# Patient Record
Sex: MALE | Race: WHITE
[De-identification: names, ages, dates, MRNs, and addresses within clinical notes are randomized per-mention and may not be internally consistent; named-entity substitution may affect disease eponyms.]

---

## 2021-01-27 ENCOUNTER — HOSPITAL ENCOUNTER (OUTPATIENT)
Dept: HOSPITAL 76 - DI.N | Age: 41
Discharge: HOME | End: 2021-01-27
Attending: FAMILY MEDICINE
Payer: COMMERCIAL

## 2021-01-27 DIAGNOSIS — S60.221A: ICD-10-CM

## 2021-01-27 DIAGNOSIS — M51.36: ICD-10-CM

## 2021-01-27 DIAGNOSIS — M47.816: Primary | ICD-10-CM

## 2021-01-27 NOTE — XRAY REPORT
PROCEDURE:  Lumbar Spine 2 View

 

INDICATIONS:  STRAIN OF MUSCLE, FASCIA AND TENDON OF LOWER BACK

 

TECHNIQUE:  3 views of the lumbar spine were acquired.  

 

COMPARISON:  None.

 

FINDINGS:  

 

Bones:  5 non-rib-bearing vertebrae are present. Minimal levocurvature, possibly positional. Mild low
er lumbar degenerative disc disease. Lower lumbar facet arthropathy. No vertebral body compression fr
actures.  No suspicious bony lesions.  

 

Soft tissues:  Overlying bowel gas pattern is normal.  No suspicious soft tissue calcifications.  

 

IMPRESSION:  Degenerative change. No acute bony abnormality of the lumbar spine.

 

Reviewed by: Charlie Truong MD on 1/27/2021 4:35 PM PST

Approved by: Charlie Truong MD on 1/27/2021 4:35 PM PST

 

 

Station ID:  [id number]

## 2021-01-27 NOTE — XRAY REPORT
PROCEDURE:  Hand 3 View RT

 

INDICATIONS:  CONTUSION OF R HAND

 

TECHNIQUE:  3 views of the hand(s) acquired.  

 

COMPARISON:  None

 

FINDINGS:  

 

Bones:  No fractures or dislocations.  No suspicious bony lesions.  

 

Soft tissues:  No suspicious soft tissue calcifications.  

 

IMPRESSION:  

No evidence acute bony abnormality of the right hand.

 

Reviewed by: Charlie Truong MD on 1/27/2021 4:35 PM PST

Approved by: Charlie Truong MD on 1/27/2021 4:35 PM [facility]

 

 

Station ID:  [id number]

## 2022-07-03 ENCOUNTER — HOSPITAL ENCOUNTER (EMERGENCY)
Dept: HOSPITAL 76 - ED | Age: 42
Discharge: HOME | End: 2022-07-03
Payer: COMMERCIAL

## 2022-07-03 VITALS — DIASTOLIC BLOOD PRESSURE: 79 MMHG | SYSTOLIC BLOOD PRESSURE: 133 MMHG

## 2022-07-03 DIAGNOSIS — M62.830: Primary | ICD-10-CM

## 2022-07-03 PROCEDURE — 72100 X-RAY EXAM L-S SPINE 2/3 VWS: CPT

## 2022-07-03 PROCEDURE — 99285 EMERGENCY DEPT VISIT HI MDM: CPT

## 2022-07-03 PROCEDURE — 99284 EMERGENCY DEPT VISIT MOD MDM: CPT

## 2022-07-03 PROCEDURE — 96374 THER/PROPH/DIAG INJ IV PUSH: CPT

## 2022-07-03 RX ADMIN — HYDROMORPHONE HYDROCHLORIDE STA: 1 INJECTION, SOLUTION INTRAMUSCULAR; INTRAVENOUS; SUBCUTANEOUS at 14:55

## 2022-07-03 RX ADMIN — KETOROLAC TROMETHAMINE STA: 30 INJECTION, SOLUTION INTRAMUSCULAR at 14:56

## 2022-07-03 RX ADMIN — DEXAMETHASONE SODIUM PHOSPHATE STA MG: 10 INJECTION, SOLUTION INTRAMUSCULAR; INTRAVENOUS at 14:54

## 2022-07-03 RX ADMIN — DEXAMETHASONE SODIUM PHOSPHATE STA: 10 INJECTION, SOLUTION INTRAMUSCULAR; INTRAVENOUS at 14:56

## 2022-07-03 RX ADMIN — DIAZEPAM STA MG: 5 INJECTION, SOLUTION INTRAMUSCULAR; INTRAVENOUS at 14:54

## 2022-07-03 RX ADMIN — HYDROMORPHONE HYDROCHLORIDE STA MG: 1 INJECTION, SOLUTION INTRAMUSCULAR; INTRAVENOUS; SUBCUTANEOUS at 14:54

## 2022-07-03 RX ADMIN — KETOROLAC TROMETHAMINE STA MG: 30 INJECTION, SOLUTION INTRAMUSCULAR at 14:54

## 2022-07-03 RX ADMIN — DIAZEPAM STA: 5 INJECTION, SOLUTION INTRAMUSCULAR; INTRAVENOUS at 14:56

## 2022-07-03 NOTE — XRAY REPORT
PROCEDURE:  Lumbar Spine 2 View

 

INDICATIONS:  fall, back pain x 1 week

 

TECHNIQUE:  3 views of the lumbar spine were acquired.  

 

COMPARISON:  None.

 

FINDINGS:  

 

Bones:  5 non-rib-bearing vertebrae are present.  Minimal degenerative changes of the vertebral maryjane
s. Mild disc space narrowing at L5-S1. There is normal bony alignment.  No vertebral body compression
 fractures.  No suspicious bony lesions.  

 

Soft tissues:  Overlying bowel gas pattern is normal.  No suspicious soft tissue calcifications.  

 

IMPRESSION:  

1. No acute abnormality of the lumbar spine.

2. Minimal degenerative changes with mild disc space narrowing at L5-S1.

 

Reviewed by: Nasir Maldonado on 7/3/2022 2:57 PM [facility]

Approved by: Nasir Maldonado on 7/3/2022 2:57 PM [facility]

 

 

Station ID:  [id number]

## 2022-07-03 NOTE — ED PHYSICIAN DOCUMENTATION
PD HPI BACK PAIN





- Stated complaint


Stated Complaint: BACK PX





- Chief complaint


Chief Complaint: Back Pain





- History obtained from


History obtained from: Patient





- History of Present Illness


Timing - onset: How many weeks ago (1)


Timing - duration: Weeks (1)


Timing - details: Gradual onset


Pain level max: 8


Pain level now: 6


Location: Mid, Lower, Right


Quality: Pain, Spasm, Similar to prior episodes


Associated symptoms: No: Fever, Weakness, Numbness, Incontinent of urine, Unable

to urinate, Hematuria, Incontinent of stool


Improves with: Rest


Worsened by: Movement


Contributing factors: No: Anticoagulated, Cancer, IVDA





- Additional information


Additional information: 





Patient is a 42-year-old male who presents to the emergency department 

complaining of back pain.  He states this has been ongoing for the past week.  

He originally injured it while twisting about a week ago.  He complains of pain 

to the lumbar spine, left side, radiates to the left leg.  No loss of bowel or 

bladder control.  Has seen his chiropractor x2 but has not helped.  Worse with 

movement and bending.  Better with remaining still.  Complains of increased 

spasm today.





Review of Systems


Constitutional: denies: Fever


Cardiac: denies: Chest pain / pressure


Respiratory: denies: Cough


GI: denies: Nausea, Vomiting


Skin: denies: Rash


Musculoskeletal: denies: Neck pain, Back pain


Neurologic: denies: Headache





PD PAST MEDICAL HISTORY





- Past Medical History


Past Medical History: No





- Past Surgical History


Past Surgical History: No





- Present Medications


Home Medications: 


                                Ambulatory Orders











 Medication  Instructions  Recorded  Confirmed


 


Meloxicam [Mobic] 7.5 mg PO BID PRN #20 tablet 07/03/22 


 


Oxycodone HCl/Acetaminophen 1 - 2 each PO Q6H PRN #14 tablet 07/03/22 





[Percocet 5-325 mg Tablet]   


 


methocarbamoL [Robaxin] 500 mg PO Q6H PRN #20 tablet 07/03/22 














- Allergies


Allergies/Adverse Reactions: 


                                    Allergies











Allergy/AdvReac Type Severity Reaction Status Date / Time


 


No Known Drug Allergies Allergy   Verified 07/03/22 13:57














- Living Situation


Living Situation: reports: With family


Living Arrangement: reports: At home





- Social History


Does the pt have substance abuse?: No





PD ED PE NORMAL





- Vitals


Vital signs reviewed: Yes





- General


General: Alert and oriented X 3, No acute distress





- HEENT


HEENT: PERRL, Moist mucous membranes





- Neck


Neck: Supple, no meningeal sign





- Cardiac


Cardiac: RRR, Strong equal pulses





- Respiratory


Respiratory: No respiratory distress, Clear bilaterally





- Abdomen


Abdomen: Soft, Non tender, Non distended





- Back


Back: Other (Mild midline tenderness palpation L3-L4.  Paraspinal spasm left 

greater than right.)





- Derm


Derm: Warm and dry





- Extremities


Extremities: No edema, No calf tenderness / cord





- Neuro


Neuro: Alert and oriented X 3, No motor deficit, No sensory deficit, Other 

(Normal bilateral lower extremity patellar and ankle jerk reflexes. Normal great

 toe extension bilaterally. no saddle anesthesia)





- Psych


Psych: Normal mood, Normal affect





Results





- Vitals


Vitals: 


                               Vital Signs - 24 hr











  07/03/22 07/03/22





  13:54 16:42


 


Temperature 37.3 C 


 


Heart Rate 81 64


 


Respiratory 16 20





Rate  


 


Blood Pressure 145/92 H 133/79 H


 


O2 Saturation 96 94








                                     Oxygen











O2 Source                      Room air

















- Rads (name of study)


  ** Lumbar spine x-ray


Radiology: Final report received, EMP read contemporaneously, See rad report (No

 acute abnormality)





PD MEDICAL DECISION MAKING





- ED course


Complexity details: reviewed results, re-evaluated patient, considered 

differential (No cauda equina, no spinal epidural abscess, no fracture, no 

aortic dissection or evidence of aneursym rupture), d/w patient


ED course: 





Pain well controlled in the emergency department.  Possible disc injury.  We 

will have him follow-up with his doctor for further care.  Potential MRI if 

fails to improve.  No evidence of cauda equina, epidural abscess or fracture.  

Patient feels much better after pain medication here.  Patient counseled 

regarding signs and symptoms for which I believe and urgent re-evaluation would 

be necessary. Patient with good understanding of and agreement to plan and is 

comfortable going home at this time





This document was made in part using voice recognition software. While efforts 

are made to proofread this document, sound alike and grammatical errors may 

occur.





Departure





- Departure


Disposition: 01 Home, Self Care


Clinical Impression: 


 Back spasm





Condition: Good


Instructions:  ED Low Back Pain Injury, ED Spasm Back No Trauma


Follow-Up: 


Luz Marina Soares PA-C [Primary Care Provider] - Within 1 week


Prescriptions: 


Meloxicam [Mobic] 7.5 mg PO BID PRN #20 tablet


 PRN Reason: Pain


Oxycodone HCl/Acetaminophen [Percocet 5-325 mg Tablet] 1 - 2 each PO Q6H PRN #14

tablet


 PRN Reason: pain


methocarbamoL [Robaxin] 500 mg PO Q6H PRN #20 tablet


 PRN Reason: muscle spasm


Comments: 


Continue gentle stretching at home.  Please follow-up with your doctor for 

further care.  Your prescriptions were sent to [facility] in [place].  Your x-

rays do not show any acute abnormalities today.





I am prescribing a short course of narcotic pain medication for you. These are 

potentially dangerous and addictive medications that should be used carefully. 


These medications may constipate you. Take an over-the-counter stool softener 

(docusate) twice daily with plenty of water while taking these medications. If 

you go 24 hours without a bowel movement, take over-the-counter miralax, per 

package instructions.


Do not drink or drive while taking these medications. 


If you received narcotic or sedating medications while in the emergency 

department, do not drive for 24 hours.


Store this medication in a safe, secure place and out of reach of children. 


It is a violation of federal law to give or sell this medication to another 

person or to use in a manner other than prescribed.


The ED will not refill narcotic prescriptions, including prescriptions lost or 

stolen.


To dispose of unwanted medications:


1. [facility] at [address] in 

[place] has a medication drop box. They accept prescription medications (in 

pill form) Monday through Friday 9:00 a.m. to 5:00 p.m. 


2. The [facility] [employer] accepts prescription medications (in

 pill form only) for disposal year round. Call [phone] for more 

information. 


3. Contact the [facility] for the next [facility] sponsored prescription 

drug collection event. [phone], [phone], or [phone] 

[phone];


Discharge Date/Time: 07/03/22 16:59

## 2022-07-08 ENCOUNTER — HOSPITAL ENCOUNTER (EMERGENCY)
Age: 42
Discharge: HOME | End: 2022-07-08
Payer: COMMERCIAL

## 2022-07-08 VITALS
SYSTOLIC BLOOD PRESSURE: 164 MMHG | OXYGEN SATURATION: 99 % | RESPIRATION RATE: 17 BRPM | DIASTOLIC BLOOD PRESSURE: 90 MMHG | HEART RATE: 66 BPM

## 2022-07-08 VITALS — BODY MASS INDEX: 38.2 KG/M2

## 2022-07-08 VITALS
DIASTOLIC BLOOD PRESSURE: 106 MMHG | HEART RATE: 84 BPM | TEMPERATURE: 96.98 F | OXYGEN SATURATION: 96 % | SYSTOLIC BLOOD PRESSURE: 185 MMHG | RESPIRATION RATE: 17 BRPM

## 2022-07-08 DIAGNOSIS — S39.012A: Primary | ICD-10-CM

## 2022-07-08 LAB
APPEARANCE UR: CLEAR
BILIRUBIN URINE UA: NEGATIVE
COLOR UR: YELLOW
GLUCOSE URINE UA: NEGATIVE G/DL
HGB UR QL: (no result)
KETONES URINE UA: NEGATIVE
LEUKOCYTE ESTERASE URINE UA: NEGATIVE
NITRITE URINE UA: NEGATIVE
PH UR: 5.5 [PH] (ref 4.5–8)
PROTEIN URINE UA: (no result)
SP GR UR: 1.02 (ref 1–1.03)
UROBILINOGEN UR QL: 0.2 E.U./DL

## 2022-07-08 PROCEDURE — 99283 EMERGENCY DEPT VISIT LOW MDM: CPT

## 2022-07-08 PROCEDURE — 81001 URINALYSIS AUTO W/SCOPE: CPT

## 2022-07-08 PROCEDURE — 96372 THER/PROPH/DIAG INJ SC/IM: CPT

## 2023-06-09 ENCOUNTER — HOSPITAL ENCOUNTER (OUTPATIENT)
Age: 43
End: 2023-06-09
Payer: COMMERCIAL

## 2023-06-09 DIAGNOSIS — J98.8: ICD-10-CM

## 2023-06-09 DIAGNOSIS — R06.02: Primary | ICD-10-CM

## 2023-06-09 PROCEDURE — 94060 EVALUATION OF WHEEZING: CPT

## 2023-06-09 PROCEDURE — 94726 PLETHYSMOGRAPHY LUNG VOLUMES: CPT

## 2023-06-09 PROCEDURE — 94729 DIFFUSING CAPACITY: CPT

## 2023-07-10 ENCOUNTER — HOSPITAL ENCOUNTER (OUTPATIENT)
Dept: HOSPITAL 76 - SC | Age: 43
Discharge: HOME | End: 2023-07-10
Attending: INTERNAL MEDICINE
Payer: COMMERCIAL

## 2023-07-10 VITALS — SYSTOLIC BLOOD PRESSURE: 136 MMHG | DIASTOLIC BLOOD PRESSURE: 90 MMHG

## 2023-07-10 DIAGNOSIS — E66.9: ICD-10-CM

## 2023-07-10 DIAGNOSIS — G47.33: Primary | ICD-10-CM

## 2023-07-10 PROCEDURE — 99202 OFFICE O/P NEW SF 15 MIN: CPT

## 2023-07-10 PROCEDURE — 99212 OFFICE O/P EST SF 10 MIN: CPT

## 2023-07-10 NOTE — SLEEP CARE CONSULTATION
Information from patient questionnaire entered by Favian England.





I have reviewed and concur with the information entered by Favian England. This 

document represents the service I personally performed and the decisions made by

me, Fredrick Brar MD, [facility].





History of Present Illness


Service Date and Time: 07/10/2023    1311


Reason for Visit: New patient


Chief Complaint: reports: Other (UPDATE MACHINE)


Date of Onset: CHILDHOOD


Usual bedtime: 7-10PM


Time it takes to fall asleep: DEPENDS ON WORRIES OF THE DAY


Snores at night: Yes


Observed to quit breathing while asleep: Yes


Sleeps alone due to snoring: No


Number of times waking at night: 2


Reasons for waking at night: reports: Bathroom


Toss, Turn, or Twitch while sleeping: No


Recalls having dreams: No


Usually gets out of bed at: 4-6AM


Feels refreshed in the morning: Yes


Morning headache: No


Sleepy or fatigued during the day: Yes


Ever fallen asleep while driving: No


Takes day naps: No


Prior sleep studies: Yes


Additional HPI information: 


I had the pleasure of seeing Mr. Wilkerson today regarding obstructive sleep 

apnea-hypopnea.  As you know, he is a 43 year old gentleman who was diagnosed 

with the sleep-disordered breathing [place] [facility] [place], [place] in 2010.  

The AHI was 31.9.  He was prescribed a CPAP device set at 11.2 cmH2O.  He uses 

every night and all night.  The compliance data show usage in 90 out of the past

90 nights, averaging 7.6 hours a night.  The residual AHI is 0.8 and average air

leak is 0 L/minute.  He wears a Respironics EasyLife nasal mask.  [place] 

[place]  He finds the 

treatment very beneficial.  His wife also uses a CPAP.








- Parasomnia Symptoms


Ever been unable to move upon waking from sleep: No


Walks in sleep: No


Talks in sleep: No


Ever acted out dreams in sleep: No


Ever felt weak in the knees when startled or emotional: No


Bothered by creepy, crawly, restless sensations in legs: No


Problems with memory or concentration: Yes





Subjective


Initial [place] Sleepiness Scale score: 0 (07/10/23)





Past Medical History


Past Medical History: reports: Hypertension





Social History


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 Patient is  and lives in [place]. 





Have you smoked in the past 12 months: No


Alcohol use: Yes


Alcohol amount and frequency: 2 DRINKS


Caffeine use: Yes


Caffeine amount and frequency: 2 CUPS DAILY





Family History


Family history of sleep disordered breathing: Yes


Family Hx Sleep Apnea: Mother: Snoring, Father: Sleep apnea - Treated





Allergies and Home Medications


Known drug allergies: No


Drug allergies reviewed: Yes


Home medication list reviewed: Yes


Allergy and home medication list: 


Allergies





No Known Drug Allergies Allergy (Verified 07/07/23 10:24)


   











Review of Systems


Cardiovascular: reports: high blood pressure, leg or foot swelling


Respiratory: reports: wheeze, sputum production


Gastrointestinal: denies: heartburn, difficulty swallowing, nausea, vomitting, 

diarrhea, abdominal pain, other


Urinary: denies: incontinence, frequency, urgency, impotence, other


Neurological: denies: headaches, seizure, head trauma, disorientation, speech 

dysfunction, gait or balance problems, fainting or unconsciousness, other


Psychiatric: denies: Attention Deficit Hyperactivity, anxiety, depression, mood 

disorder, claustrophobia, other


Ear/Nose/Throat: reports: nasal congestion, sinus problems


Endocrine: denies: thyroid disease, history of goiter, sluggishness, too hot or 

cold, excessive thirst, increased appetite, increased urination, unexplained 

weakness, other


Musculoskeletal: reports: joint pain, back pain


Immunologic: reports: sneezing





Physical Exam


Vital signs obtained and entered by: FAVIAN IGLESIAS MA


Blood Pressure: 136/90


Heart Rate: 63


O2 Saturation: 96


Height: 6 ft 1 in


Weight: 296 lb 12.8 oz


Body Mass Index: 39.1


BMI Classification: Obese


Neck circumference: 18.75


Mood/affect: normal


HEENT: No craniofacial malformation


Nostrils: patent to airflow


Turbinates: normal


Septum: midline


Mouth and throat: narrow oropharynx


Soft palate: long


Hard palate: normal


Uvula: normal


Uvula visualization: 25% Mallampati Class III


Tongue: normal in size


Tonsils: small


Chin and jaw: normal size and position


Neck: normal w/o lymphadenopathy or thyromegaly


Heart: regular rate and rhythm


Lungs: clear bilaterally


Extremities: no edema or clubbing


Neurologic: intact





Impression and Plan


IMPRESSION: 1. Obstructive Sleep Apnea-Hypopnea Syndrome, severe, as previously 

diagnosed 13 years ago.  Narrow oropharynx and obesity are common predisposing 

factors for obstructive sleep apnea-hypopnea syndrome.  He has excellent CPAP 

compliance.  Because the CPAP is now older than the useful life of 5 years, I 

will order the patient a new one and make it an autoCPAP set between 8 and 12 

cmH2O.





Plan:  1. Prescription made for an autoCPAP, heated humidifier, and related 

supplies through [facility]. 


   2. Try to lose weight. 


   3. Return for follow up after one month of using the new CPAP. 





Counseling Topics: Weight control


Prescriptions: Auto CPAP


Follow up with Sleep Care in: 1-2 months


Visit Type: In Office


Time Spent with Patient (minutes): 15


Provider Statement: I spent 100% of the Face to Face Visit with the patient with

greater than 50% spent counseling the patient and coordination of care.